# Patient Record
Sex: FEMALE | Race: WHITE | NOT HISPANIC OR LATINO | Employment: OTHER | URBAN - METROPOLITAN AREA
[De-identification: names, ages, dates, MRNs, and addresses within clinical notes are randomized per-mention and may not be internally consistent; named-entity substitution may affect disease eponyms.]

---

## 2017-10-12 ENCOUNTER — CONVERSION ENCOUNTER (OUTPATIENT)
Dept: GENERAL RADIOLOGY | Facility: HOSPITAL | Age: 52
End: 2017-10-12

## 2018-03-23 ENCOUNTER — OFFICE VISIT CONVERTED (OUTPATIENT)
Dept: FAMILY MEDICINE CLINIC | Facility: CLINIC | Age: 53
End: 2018-03-23
Attending: NURSE PRACTITIONER

## 2018-07-19 ENCOUNTER — CONVERSION ENCOUNTER (OUTPATIENT)
Dept: FAMILY MEDICINE CLINIC | Facility: CLINIC | Age: 53
End: 2018-07-19

## 2018-07-19 ENCOUNTER — OFFICE VISIT CONVERTED (OUTPATIENT)
Dept: FAMILY MEDICINE CLINIC | Facility: CLINIC | Age: 53
End: 2018-07-19
Attending: NURSE PRACTITIONER

## 2018-08-09 ENCOUNTER — CONVERSION ENCOUNTER (OUTPATIENT)
Dept: FAMILY MEDICINE CLINIC | Facility: CLINIC | Age: 53
End: 2018-08-09

## 2018-08-09 ENCOUNTER — OFFICE VISIT CONVERTED (OUTPATIENT)
Dept: FAMILY MEDICINE CLINIC | Facility: CLINIC | Age: 53
End: 2018-08-09
Attending: NURSE PRACTITIONER

## 2018-11-29 ENCOUNTER — CONVERSION ENCOUNTER (OUTPATIENT)
Dept: FAMILY MEDICINE CLINIC | Facility: CLINIC | Age: 53
End: 2018-11-29

## 2018-11-29 ENCOUNTER — OFFICE VISIT CONVERTED (OUTPATIENT)
Dept: FAMILY MEDICINE CLINIC | Facility: CLINIC | Age: 53
End: 2018-11-29
Attending: NURSE PRACTITIONER

## 2019-02-07 ENCOUNTER — OFFICE VISIT CONVERTED (OUTPATIENT)
Dept: FAMILY MEDICINE CLINIC | Facility: CLINIC | Age: 54
End: 2019-02-07
Attending: NURSE PRACTITIONER

## 2019-02-26 ENCOUNTER — OFFICE VISIT CONVERTED (OUTPATIENT)
Dept: FAMILY MEDICINE CLINIC | Facility: CLINIC | Age: 54
End: 2019-02-26
Attending: NURSE PRACTITIONER

## 2019-02-26 ENCOUNTER — CONVERSION ENCOUNTER (OUTPATIENT)
Dept: FAMILY MEDICINE CLINIC | Facility: CLINIC | Age: 54
End: 2019-02-26

## 2019-02-28 ENCOUNTER — HOSPITAL ENCOUNTER (OUTPATIENT)
Dept: GENERAL RADIOLOGY | Facility: HOSPITAL | Age: 54
Discharge: HOME | End: 2019-02-28
Attending: NURSE PRACTITIONER

## 2019-03-06 ENCOUNTER — OFFICE VISIT CONVERTED (OUTPATIENT)
Dept: SURGERY | Facility: CLINIC | Age: 54
End: 2019-03-06
Attending: NURSE PRACTITIONER

## 2019-03-15 ENCOUNTER — HOSPITAL ENCOUNTER (OUTPATIENT)
Dept: SURGERY | Facility: HOSPITAL | Age: 54
Setting detail: HOSPITAL OUTPATIENT SURGERY
Discharge: HOME | End: 2019-03-15
Attending: SURGERY

## 2019-07-12 ENCOUNTER — OFFICE VISIT CONVERTED (OUTPATIENT)
Dept: FAMILY MEDICINE CLINIC | Facility: CLINIC | Age: 54
End: 2019-07-12
Attending: NURSE PRACTITIONER

## 2019-07-12 ENCOUNTER — HOSPITAL ENCOUNTER (OUTPATIENT)
Dept: LAB | Facility: HOSPITAL | Age: 54
Discharge: HOME | End: 2019-07-12
Attending: NURSE PRACTITIONER

## 2019-07-12 LAB
25(OH)D3 SERPL-MCNC: 29.4 NG/ML (ref 30–100)
ALBUMIN SERPL-MCNC: 3.8 G/DL (ref 3.5–5)
ALBUMIN/GLOB SERPL: 1 {RATIO} (ref 1.4–2.6)
ALP SERPL-CCNC: 157 U/L (ref 53–141)
ALT SERPL-CCNC: 11 U/L (ref 10–40)
ANION GAP SERPL CALC-SCNC: 18 MMOL/L (ref 8–19)
AST SERPL-CCNC: 15 U/L (ref 15–50)
BASOPHILS # BLD AUTO: 0.03 10*3/UL (ref 0–0.2)
BASOPHILS NFR BLD AUTO: 0.5 % (ref 0–3)
BILIRUB SERPL-MCNC: 0.35 MG/DL (ref 0.2–1.3)
BUN SERPL-MCNC: 11 MG/DL (ref 5–25)
BUN/CREAT SERPL: 10 {RATIO} (ref 6–20)
CALCIUM SERPL-MCNC: 9.5 MG/DL (ref 8.7–10.4)
CHLORIDE SERPL-SCNC: 109 MMOL/L (ref 99–111)
CHOLEST SERPL-MCNC: 129 MG/DL (ref 107–200)
CHOLEST/HDLC SERPL: 3 {RATIO} (ref 3–6)
CONV ABS IMM GRAN: 0.01 10*3/UL (ref 0–0.2)
CONV CO2: 24 MMOL/L (ref 22–32)
CONV IMMATURE GRAN: 0.2 % (ref 0–1.8)
CONV TOTAL PROTEIN: 7.8 G/DL (ref 6.3–8.2)
CREAT UR-MCNC: 1.12 MG/DL (ref 0.5–0.9)
DEPRECATED RDW RBC AUTO: 51.2 FL (ref 36.4–46.3)
EOSINOPHIL # BLD AUTO: 0.09 10*3/UL (ref 0–0.7)
EOSINOPHIL # BLD AUTO: 1.4 % (ref 0–7)
ERYTHROCYTE [DISTWIDTH] IN BLOOD BY AUTOMATED COUNT: 15 % (ref 11.7–14.4)
GFR SERPLBLD BASED ON 1.73 SQ M-ARVRAT: 56 ML/MIN/{1.73_M2}
GLOBULIN UR ELPH-MCNC: 4 G/DL (ref 2–3.5)
GLUCOSE SERPL-MCNC: 95 MG/DL (ref 65–99)
HBA1C MFR BLD: 12.3 G/DL (ref 12–16)
HCT VFR BLD AUTO: 39.1 % (ref 37–47)
HDLC SERPL-MCNC: 43 MG/DL (ref 40–60)
LDLC SERPL CALC-MCNC: 59 MG/DL (ref 70–100)
LYMPHOCYTES # BLD AUTO: 2.96 10*3/UL (ref 1–5)
MCH RBC QN AUTO: 29.1 PG (ref 27–31)
MCHC RBC AUTO-ENTMCNC: 31.5 G/DL (ref 33–37)
MCV RBC AUTO: 92.4 FL (ref 81–99)
MONOCYTES # BLD AUTO: 0.5 10*3/UL (ref 0.2–1.2)
MONOCYTES NFR BLD AUTO: 8 % (ref 3–10)
NEUTROPHILS # BLD AUTO: 2.63 10*3/UL (ref 2–8)
NEUTROPHILS NFR BLD AUTO: 42.3 % (ref 30–85)
NRBC CBCN: 0 % (ref 0–0.7)
OSMOLALITY SERPL CALC.SUM OF ELEC: 303 MOSM/KG (ref 273–304)
PLATELET # BLD AUTO: 173 10*3/UL (ref 130–400)
PMV BLD AUTO: 11.7 FL (ref 9.4–12.3)
POTASSIUM SERPL-SCNC: 3.5 MMOL/L (ref 3.5–5.3)
RBC # BLD AUTO: 4.23 10*6/UL (ref 4.2–5.4)
SODIUM SERPL-SCNC: 147 MMOL/L (ref 135–147)
T4 FREE SERPL-MCNC: 1.1 NG/DL (ref 0.9–1.8)
TRIGL SERPL-MCNC: 133 MG/DL (ref 40–150)
TSH SERPL-ACNC: 1.36 M[IU]/L (ref 0.27–4.2)
VARIANT LYMPHS NFR BLD MANUAL: 47.6 % (ref 20–45)
VLDLC SERPL-MCNC: 27 MG/DL (ref 5–37)
WBC # BLD AUTO: 6.22 10*3/UL (ref 4.8–10.8)

## 2019-07-18 ENCOUNTER — HOSPITAL ENCOUNTER (OUTPATIENT)
Dept: GENERAL RADIOLOGY | Facility: HOSPITAL | Age: 54
Discharge: HOME | End: 2019-07-18
Attending: NURSE PRACTITIONER

## 2019-07-23 ENCOUNTER — HOSPITAL ENCOUNTER (OUTPATIENT)
Dept: GENERAL RADIOLOGY | Facility: HOSPITAL | Age: 54
Discharge: HOME | End: 2019-07-23
Attending: NURSE PRACTITIONER

## 2019-11-13 ENCOUNTER — OFFICE VISIT CONVERTED (OUTPATIENT)
Dept: FAMILY MEDICINE CLINIC | Facility: CLINIC | Age: 54
End: 2019-11-13
Attending: NURSE PRACTITIONER

## 2019-11-18 ENCOUNTER — HOSPITAL ENCOUNTER (OUTPATIENT)
Dept: GENERAL RADIOLOGY | Facility: HOSPITAL | Age: 54
Discharge: HOME | End: 2019-11-18
Attending: NURSE PRACTITIONER

## 2020-08-26 ENCOUNTER — OFFICE VISIT CONVERTED (OUTPATIENT)
Dept: FAMILY MEDICINE CLINIC | Facility: CLINIC | Age: 55
End: 2020-08-26
Attending: PHYSICIAN ASSISTANT

## 2020-08-28 ENCOUNTER — HOSPITAL ENCOUNTER (OUTPATIENT)
Dept: LAB | Facility: HOSPITAL | Age: 55
Discharge: HOME | End: 2020-08-28
Attending: PHYSICIAN ASSISTANT

## 2020-08-28 LAB
25(OH)D3 SERPL-MCNC: 25 NG/ML (ref 30–100)
ALBUMIN SERPL-MCNC: 3.6 G/DL (ref 3.5–5)
ALBUMIN/GLOB SERPL: 0.9 {RATIO} (ref 1.4–2.6)
ALP SERPL-CCNC: 141 U/L (ref 53–141)
ALT SERPL-CCNC: 11 U/L (ref 10–40)
ANION GAP SERPL CALC-SCNC: 14 MMOL/L (ref 8–19)
AST SERPL-CCNC: 19 U/L (ref 15–50)
BASOPHILS # BLD AUTO: 0.03 10*3/UL (ref 0–0.2)
BASOPHILS NFR BLD AUTO: 0.5 % (ref 0–3)
BILIRUB SERPL-MCNC: 0.34 MG/DL (ref 0.2–1.3)
BUN SERPL-MCNC: 14 MG/DL (ref 5–25)
BUN/CREAT SERPL: 12 {RATIO} (ref 6–20)
CALCIUM SERPL-MCNC: 10.4 MG/DL (ref 8.7–10.4)
CHLORIDE SERPL-SCNC: 110 MMOL/L (ref 99–111)
CHOLEST SERPL-MCNC: 155 MG/DL (ref 107–200)
CHOLEST/HDLC SERPL: 3.4 {RATIO} (ref 3–6)
CONV ABS IMM GRAN: 0.01 10*3/UL (ref 0–0.2)
CONV CO2: 26 MMOL/L (ref 22–32)
CONV IMMATURE GRAN: 0.2 % (ref 0–1.8)
CONV TOTAL PROTEIN: 7.4 G/DL (ref 6.3–8.2)
CREAT UR-MCNC: 1.16 MG/DL (ref 0.5–0.9)
DEPRECATED RDW RBC AUTO: 51.5 FL (ref 36.4–46.3)
EOSINOPHIL # BLD AUTO: 0.14 10*3/UL (ref 0–0.7)
EOSINOPHIL # BLD AUTO: 2.2 % (ref 0–7)
ERYTHROCYTE [DISTWIDTH] IN BLOOD BY AUTOMATED COUNT: 14.8 % (ref 11.7–14.4)
GFR SERPLBLD BASED ON 1.73 SQ M-ARVRAT: 53 ML/MIN/{1.73_M2}
GLOBULIN UR ELPH-MCNC: 3.8 G/DL (ref 2–3.5)
GLUCOSE SERPL-MCNC: 91 MG/DL (ref 65–99)
HCT VFR BLD AUTO: 40.3 % (ref 37–47)
HDLC SERPL-MCNC: 46 MG/DL (ref 40–60)
HGB BLD-MCNC: 12.4 G/DL (ref 12–16)
LDLC SERPL CALC-MCNC: 82 MG/DL (ref 70–100)
LYMPHOCYTES # BLD AUTO: 3.03 10*3/UL (ref 1–5)
LYMPHOCYTES NFR BLD AUTO: 48.2 % (ref 20–45)
MCH RBC QN AUTO: 29.1 PG (ref 27–31)
MCHC RBC AUTO-ENTMCNC: 30.8 G/DL (ref 33–37)
MCV RBC AUTO: 94.6 FL (ref 81–99)
MONOCYTES # BLD AUTO: 0.45 10*3/UL (ref 0.2–1.2)
MONOCYTES NFR BLD AUTO: 7.2 % (ref 3–10)
NEUTROPHILS # BLD AUTO: 2.62 10*3/UL (ref 2–8)
NEUTROPHILS NFR BLD AUTO: 41.7 % (ref 30–85)
NRBC CBCN: 0 % (ref 0–0.7)
OSMOLALITY SERPL CALC.SUM OF ELEC: 300 MOSM/KG (ref 273–304)
PLATELET # BLD AUTO: 184 10*3/UL (ref 130–400)
PMV BLD AUTO: 10.9 FL (ref 9.4–12.3)
POTASSIUM SERPL-SCNC: 4.6 MMOL/L (ref 3.5–5.3)
RBC # BLD AUTO: 4.26 10*6/UL (ref 4.2–5.4)
SODIUM SERPL-SCNC: 145 MMOL/L (ref 135–147)
T4 FREE SERPL-MCNC: 0.9 NG/DL (ref 0.9–1.8)
TRIGL SERPL-MCNC: 135 MG/DL (ref 40–150)
TSH SERPL-ACNC: 2.19 M[IU]/L (ref 0.27–4.2)
VLDLC SERPL-MCNC: 27 MG/DL (ref 5–37)
WBC # BLD AUTO: 6.28 10*3/UL (ref 4.8–10.8)

## 2021-02-22 ENCOUNTER — CONVERSION ENCOUNTER (OUTPATIENT)
Dept: FAMILY MEDICINE CLINIC | Facility: CLINIC | Age: 56
End: 2021-02-22

## 2021-02-22 ENCOUNTER — OFFICE VISIT CONVERTED (OUTPATIENT)
Dept: FAMILY MEDICINE CLINIC | Facility: CLINIC | Age: 56
End: 2021-02-22
Attending: PHYSICIAN ASSISTANT

## 2021-02-26 ENCOUNTER — HOSPITAL ENCOUNTER (OUTPATIENT)
Dept: LAB | Facility: HOSPITAL | Age: 56
Discharge: HOME | End: 2021-02-26
Attending: PHYSICIAN ASSISTANT

## 2021-02-26 LAB
25(OH)D3 SERPL-MCNC: 26 NG/ML (ref 30–100)
ALBUMIN SERPL-MCNC: 3.6 G/DL (ref 3.5–5)
ALBUMIN/GLOB SERPL: 1 {RATIO} (ref 1.4–2.6)
ALP SERPL-CCNC: 162 U/L (ref 53–141)
ALT SERPL-CCNC: 8 U/L (ref 10–40)
ANION GAP SERPL CALC-SCNC: 15 MMOL/L (ref 8–19)
APPEARANCE UR: ABNORMAL
AST SERPL-CCNC: 15 U/L (ref 15–50)
BASOPHILS # BLD AUTO: 0.03 10*3/UL (ref 0–0.2)
BASOPHILS NFR BLD AUTO: 0.5 % (ref 0–3)
BILIRUB SERPL-MCNC: 0.24 MG/DL (ref 0.2–1.3)
BILIRUB UR QL: NEGATIVE
BUN SERPL-MCNC: 15 MG/DL (ref 5–25)
BUN/CREAT SERPL: 15 {RATIO} (ref 6–20)
CALCIUM SERPL-MCNC: 9.4 MG/DL (ref 8.7–10.4)
CHLORIDE SERPL-SCNC: 112 MMOL/L (ref 99–111)
CHOLEST SERPL-MCNC: 128 MG/DL (ref 107–200)
CHOLEST/HDLC SERPL: 2.8 {RATIO} (ref 3–6)
COLOR UR: YELLOW
CONV ABS IMM GRAN: 0.02 10*3/UL (ref 0–0.2)
CONV BACTERIA: ABNORMAL
CONV CO2: 23 MMOL/L (ref 22–32)
CONV COLLECTION SOURCE (UA): ABNORMAL
CONV HYALINE CASTS IN URINE MICRO: ABNORMAL /[LPF]
CONV IMMATURE GRAN: 0.3 % (ref 0–1.8)
CONV TOTAL PROTEIN: 7.3 G/DL (ref 6.3–8.2)
CONV UROBILINOGEN IN URINE BY AUTOMATED TEST STRIP: 0.2 {EHRLICHU}/DL (ref 0.1–1)
CREAT UR-MCNC: 1.02 MG/DL (ref 0.5–0.9)
DEPRECATED RDW RBC AUTO: 52.2 FL (ref 36.4–46.3)
EOSINOPHIL # BLD AUTO: 0.15 10*3/UL (ref 0–0.7)
EOSINOPHIL # BLD AUTO: 2.4 % (ref 0–7)
ERYTHROCYTE [DISTWIDTH] IN BLOOD BY AUTOMATED COUNT: 14.9 % (ref 11.7–14.4)
GFR SERPLBLD BASED ON 1.73 SQ M-ARVRAT: >60 ML/MIN/{1.73_M2}
GLOBULIN UR ELPH-MCNC: 3.7 G/DL (ref 2–3.5)
GLUCOSE SERPL-MCNC: 89 MG/DL (ref 65–99)
GLUCOSE UR QL: NEGATIVE MG/DL
HCT VFR BLD AUTO: 38 % (ref 37–47)
HDLC SERPL-MCNC: 45 MG/DL (ref 40–60)
HGB BLD-MCNC: 11.7 G/DL (ref 12–16)
HGB UR QL STRIP: NEGATIVE
KETONES UR QL STRIP: NEGATIVE MG/DL
LDLC SERPL CALC-MCNC: 65 MG/DL (ref 70–100)
LEUKOCYTE ESTERASE UR QL STRIP: ABNORMAL
LYMPHOCYTES # BLD AUTO: 3.27 10*3/UL (ref 1–5)
LYMPHOCYTES NFR BLD AUTO: 53.3 % (ref 20–45)
MCH RBC QN AUTO: 29 PG (ref 27–31)
MCHC RBC AUTO-ENTMCNC: 30.8 G/DL (ref 33–37)
MCV RBC AUTO: 94.1 FL (ref 81–99)
MONOCYTES # BLD AUTO: 0.51 10*3/UL (ref 0.2–1.2)
MONOCYTES NFR BLD AUTO: 8.3 % (ref 3–10)
NEUTROPHILS # BLD AUTO: 2.15 10*3/UL (ref 2–8)
NEUTROPHILS NFR BLD AUTO: 35.2 % (ref 30–85)
NITRITE UR QL STRIP: NEGATIVE
NRBC CBCN: 0 % (ref 0–0.7)
OSMOLALITY SERPL CALC.SUM OF ELEC: 302 MOSM/KG (ref 273–304)
PH UR STRIP.AUTO: 7 [PH] (ref 5–8)
PLATELET # BLD AUTO: 179 10*3/UL (ref 130–400)
PMV BLD AUTO: 11.5 FL (ref 9.4–12.3)
POTASSIUM SERPL-SCNC: 3.6 MMOL/L (ref 3.5–5.3)
PROT UR QL: NEGATIVE MG/DL
RBC # BLD AUTO: 4.04 10*6/UL (ref 4.2–5.4)
RBC #/AREA URNS HPF: ABNORMAL /[HPF]
SODIUM SERPL-SCNC: 146 MMOL/L (ref 135–147)
SP GR UR: 1.01 (ref 1–1.03)
SQUAMOUS SPT QL MICRO: ABNORMAL /[HPF]
T4 FREE SERPL-MCNC: 1.4 NG/DL (ref 0.9–1.8)
TRIGL SERPL-MCNC: 88 MG/DL (ref 40–150)
TSH SERPL-ACNC: 0.57 M[IU]/L (ref 0.27–4.2)
VLDLC SERPL-MCNC: 18 MG/DL (ref 5–37)
WBC # BLD AUTO: 6.13 10*3/UL (ref 4.8–10.8)
WBC #/AREA URNS HPF: ABNORMAL /[HPF]

## 2021-02-28 LAB — BACTERIA UR CULT: NORMAL

## 2021-03-03 ENCOUNTER — HOSPITAL ENCOUNTER (OUTPATIENT)
Dept: LAB | Facility: HOSPITAL | Age: 56
Discharge: HOME | End: 2021-03-03
Attending: PHYSICIAN ASSISTANT

## 2021-03-03 ENCOUNTER — HOSPITAL ENCOUNTER (OUTPATIENT)
Dept: GENERAL RADIOLOGY | Facility: HOSPITAL | Age: 56
Discharge: HOME | End: 2021-03-03
Attending: PHYSICIAN ASSISTANT

## 2021-03-05 LAB
ALP BONE CFR SERPL: 29 % (ref 14–68)
ALP INTEST CFR SERPL: 2 % (ref 0–18)
ALP LIVER CFR SERPL: 69 % (ref 18–85)
ALP SERPL-CCNC: 156 IU/L (ref 39–117)

## 2021-05-13 NOTE — PROGRESS NOTES
Progress Note      Patient Name: Carlee Shipley   Patient ID: [id number]   Sex: Female   YOB: 1965    Primary Care Provider: Jeannie SOLER   Referring Provider: Jeannie SOLER    Visit Date: August 26, 2020    Provider: KARLENE Frey   Location: [facility]   Location Address: [address] [place] [address], [address]  [place], [place]  [id number]   Location Phone: [phone]          Chief Complaint  · follow up/medication refills       History Of Present Illness  Carlee Shipley is a 54 year old /White female who presents for evaluation and treatment of:      Patient is here for follow up/medication refills     Anxiety: patient is on Zoloft & doing well. She states she is sleeping well & her energy is good for the most part. She denies SI/HI. She also has hydroxyzine to take prn & she sees physchologist every month; Charly Paredes    Migraines: She is currently on Gabapentin, Propanolol , Topamax & Alsuma & states she is doing well. She is followed by [facility] [place] every 3m. She is also taking Promethazine prn.    Back Pain: She is currently on Norco & doing well. She is followed by Pain Management every month    Hypothyroidism: Patient is currently on Levothyroxine 100 & states she is doing well. She is sleeping well & her energy level is good for the most part. It has been over a year since labs have been done    HL: Patient is currently on Atorvastatin & doing well. She denies leg cramps/pain or muscle weakness. It has been over a year since labs have been done.                      Past Medical History  Disease Name Date Onset Notes   Arthritis --  --    Depression --  --    Hyperlipidemia --  --    Hypothyroidism --  --    Low vitamin D --  --    Lumbago/low back pain --  --    Major depressive disorder 07/11/2017 --    Migraine Headaches --  --    Muscle weakness of lower extremity 05/12/2015 right foot   Radiculopathy, lumbosacral 05/12/2015  right   Spleen injury --  --          Past Surgical History  Procedure Name Date Notes   Adenoidectomy 1972 --    Cholecystectomy --  --    Colonoscopy 5/2015 --    endoscopy 5/2015 --    Hysterectomy --  --    Spinal Cord Stimulator --  --    Splenectomy --  --    Tonsillectomy --  --          Medication List  Name Date Started Instructions   Alsuma 6 mg/0.5 mL Subcutaneous Pen Injector  inject 0.5 milliliter (6 mg) by subcutaneous route once; may be repeated 1 hour after the first dose if headache pain returns or increases i   atorvastatin 40 mg oral tablet 06/19/2020 TAKE ONE TABLET AT BEDTIME   EpiPen 0.3 mg/0.3 mL (1:1,000) injection auto-injector 04/28/2015 inject 0.3 mg by intramuscular route once as needed for anaphylaxis   ergocalciferol (vitamin D2) 1,250 mcg (50,000 unit) oral capsule 08/26/2020 TAKE ONE CAPSULE ONE TIME PER WEEK. for 90 days   gabapentin 300 mg oral capsule 09/20/2017 TAKE 2 CAPSULES BY MOUTH THREE TIMES A DAY   hydroxyzine HCl 10 mg oral tablet  take 1 tablet by oral route As needed   levothyroxine 100 mcg oral tablet 08/26/2020 take 1 tablet (100 mcg) by oral route once daily for 90 days   Norco  mg oral tablet 11/13/2019 1-2 po q 4-6hrs prn   promethazine 25 mg oral tablet  take 1 tablet by oral route q6hrs prn   propranolol 160 mg oral capsule,extended release 24 hr  take 1 capsule (160 mg) by oral route once daily   Topamax 100 mg Oral tablet  take 2 tablets by oral route   Vraylar 3 mg oral capsule  take 1 capsule (3 mg) by oral route once daily   Zoloft 50 mg oral tablet  take 1 tablet (50 mg) by oral route once daily         Allergy List  Allergen Name Date Reaction Notes   azithromycin --  nausea, dehydration --    Fish --  --  --    INFLUENZA VIRUS VACCINES --  --  --    pork --  --  --    Seasonal --  --  --        Allergies Reconciled  Family Medical History  Disease Name Relative/Age Notes   Stroke Sister/   --    Cancer, Unspecified  --    Diabetes, unspecified  "Mother/  Sister/   --          Reproductive History  Menstrual   Age Menarche: 11   Pregnancy Summary   Total Pregnancies: 1 Full Term: 1 Premature: 0   Ab Induced: 0 Ab Spontaneous: 0 Ectopics: 0   Multiples: 0 Livin         Social History  Finding Status Start/Stop Quantity Notes   Alcohol Never --/-- --  --     --  --/-- --  --    No known infection risk --  --/-- --  --    Sedentary --  --/-- --  --    Tobacco Never --/-- --  2017 - never 2017 - never 2017 - never          Review of Systems  · Constitutional  o Denies  o : fever, fatigue, weight loss, weight gain  · Cardiovascular  o Denies  o : lower extremity edema, claudication, chest pressure, palpitations  · Respiratory  o Denies  o : shortness of breath, wheezing, cough, hemoptysis, dyspnea on exertion  · Gastrointestinal  o Denies  o : nausea, vomiting, diarrhea, constipation, abdominal pain      Vitals  Date Time BP Position Site L\R Cuff Size HR RR TEMP (F) WT  HT  BMI kg/m2 BSA m2 O2 Sat        2020 11:42 /63 Sitting    70 - R  98.2 262lbs 4oz 5'  3\" 46.46 2.3 97 %          Physical Examination  · Constitutional  o Appearance  o : well developed, well-nourished obese, no acute distress  · Head and Face  o Head  o : normocephalic, atraumatic  · Neck  o Inspection/Palpation  o : normal appearance, no masses or tenderness, trachea midline  o Thyroid  o : gland size normal, nontender, no nodules or masses present on palpation  · Respiratory  o Respiratory Effort  o : breathing unlabored  o Inspection of Chest  o : chest rise symmetric bilaterally  o Auscultation of Lungs  o : clear to auscultation bilaterally throughout inspiration and expiration  · Cardiovascular  o Heart  o :   § Auscultation of Heart  § : regular rate and rhythm, no murmurs, gallops or rubs  o Peripheral Vascular System  o :   § Extremities  § : no edema  · Lymphatic  o Neck  o : no cervical lymphadenopathy, no supraclavicular " lymphadenopathy  · Psychiatric  o Mood and Affect  o : mood normal, affect appropriate          Assessment  · Anxiety disorder     300.00/F41.9  · Hyperlipidemia     272.4/E78.5  · Hypothyroidism     244.9/E03.9  · Vitamin D deficiency     268.9/E55.9  · Lumbago/low back pain     724.2/M54.5  · Migraine     346.90/G43.909    Problems Reconciled  Plan  · Orders  o HTN/Lipid Panel (CMP, Lipid) [facility] (90702, 34242) - 272.4/E78.5 - 08/26/2020  o Thyroid Profile (THYII, 66024, 22548) - 244.9/E03.9 - 08/26/2020  o Vitamin D Level (24384) - 268.9/E55.9 - 08/26/2020  o CBC with Auto Diff [facility] (47828) - 346.90/G43.909, 300.00/F41.9, 244.9/E03.9 - 08/26/2020  o ACO-39: Current medications updated and reviewed () - - 08/26/2020  · Medications  o levothyroxine 100 mcg oral tablet   SIG: take 1 tablet (100 mcg) by oral route once daily for 90 days   DISP: (90) Tablet with 1 refills  Adjusted on 08/26/2020     o ergocalciferol (vitamin D2) 50,000 unit oral capsule   SIG: TAKE ONE CAPSULE ONE TIME PER WEEK. for 90 days   DISP: (13) Capsule with 3 refills  Refilled on 08/26/2020     o Medications have been Reconciled  o Transition of Care or Provider Policy  · Instructions  o Recommended exercise program to assist with cholesterol, weight loss and overall health improvement.  o Patient was educated/instructed on their diagnosis, treatment and medications prior to discharge from the clinic today.  o Call the office with any concerns or questions.  o Electronically Identified Patient Education Materials Provided Electronically  · Disposition  o Follow Up in 6 months.            Electronically Signed by: KARLENE Frey -Author on August 26, 2020 12:20:28 PM

## 2021-05-14 VITALS
HEIGHT: 63 IN | HEART RATE: 69 BPM | TEMPERATURE: 98.3 F | SYSTOLIC BLOOD PRESSURE: 109 MMHG | OXYGEN SATURATION: 99 % | WEIGHT: 275.12 LBS | DIASTOLIC BLOOD PRESSURE: 68 MMHG | BODY MASS INDEX: 48.75 KG/M2

## 2021-05-14 VITALS
TEMPERATURE: 98.2 F | OXYGEN SATURATION: 97 % | WEIGHT: 262.25 LBS | DIASTOLIC BLOOD PRESSURE: 63 MMHG | HEIGHT: 63 IN | HEART RATE: 70 BPM | SYSTOLIC BLOOD PRESSURE: 119 MMHG | BODY MASS INDEX: 46.46 KG/M2

## 2021-05-14 NOTE — PROGRESS NOTES
Progress Note      Patient Name: Carlee hSipley   Patient ID: [id number]   Sex: Female   YOB: 1965    Primary Care Provider: Jeannie SOLER   Referring Provider: Jeannie SOLER    Visit Date: February 22, 2021    Provider: KARLENE Frey   Location: [facility]   Location Address: [address] [place] [address], [address]  [place], [place]  [id number]   Location Phone: [phone]          Chief Complaint  · follow up   · medication refill       History Of Present Illness  Carlee Shipley is a 55 year old /White female who presents for evaluation and treatment of:      Patient is here for follow up and medication refills; last labs showed some renal insuffiency.    Anxiety: She is taking Zoloft and doing well. She is followed by Charly Paredes every 3m. She states she can fall asleep but states she has trouble staying asleep     Migraines: She is taking Gabapentin, Propanolol, Topamax and Alsuma and doing well. She is followed by Neurology Dr Humphreys every 3m. She also has Promethazine to take prn    Hypothyroidism: She is taking Levothyroixine with good results, she states her energy level is ok. She has issues staying asleep     RLS: She just started Benztropine 5d ago and is followed by Charly Paredes    Vitamin D def: patient is taking otc vitamin D 2000 iu qd; due for recheck    HL: She is taking Atorvastatin with good results and denies leg pain/cramps and muscle weakness     MMG ordered 3/3/21  CLN 5/2019           Past Medical History  Disease Name Date Onset Notes   Arthritis --  --    Depression --  --    Hyperlipidemia --  --    Hypothyroidism --  --    Low vitamin D --  --    Lumbago/low back pain --  --    Major depressive disorder 07/11/2017 --    Migraine Headaches --  --    Muscle weakness of lower extremity 05/12/2015 right foot   Radiculopathy, lumbosacral 05/12/2015 right   Spleen injury --  --          Past Surgical History  Procedure Name  Date Notes   Adenoidectomy 1972 --    Cholecystectomy --  --    Colonoscopy 5/2015 --    endoscopy 5/2015 --    Hysterectomy --  --    Spinal Cord Stimulator --  --    Splenectomy --  --    Tonsillectomy --  --          Medication List  Name Date Started Instructions   Alsuma 6 mg/0.5 mL Subcutaneous Pen Injector  inject 0.5 milliliter (6 mg) by subcutaneous route once; may be repeated 1 hour after the first dose if headache pain returns or increases i   atorvastatin 40 mg oral tablet 09/25/2020 TAKE ONE TABLET AT BEDTIME for 90 days   benztropine 1 mg oral tablet  take 1 tablet (1 mg) by oral route once daily   EpiPen 0.3 mg/0.3 mL (1:1,000) injection auto-injector 04/28/2015 inject 0.3 mg by intramuscular route once as needed for anaphylaxis   ergocalciferol (vitamin D2) 1,250 mcg (50,000 unit) oral capsule 08/26/2020 TAKE ONE CAPSULE ONE TIME PER WEEK. for 90 days   gabapentin 300 mg oral capsule 09/20/2017 TAKE 2 CAPSULES BY MOUTH THREE TIMES A DAY   hydroxyzine HCl 10 mg oral tablet  take 1 tablet by oral route As needed   levothyroxine 100 mcg oral tablet 08/26/2020 take 1 tablet (100 mcg) by oral route once daily for 90 days   Norco  mg oral tablet 11/13/2019 1-2 po q 4-6hrs prn   promethazine 25 mg oral tablet  take 1 tablet by oral route q6hrs prn   propranolol 160 mg oral capsule,extended release 24 hr  take 1 capsule (160 mg) by oral route once daily   Topamax 100 mg Oral tablet  take 2 tablets by oral route   Vraylar 3 mg oral capsule  take 1 capsule (3 mg) by oral route once daily   Zoloft 50 mg oral tablet  take 1 tablet (50 mg) by oral route once daily         Allergy List  Allergen Name Date Reaction Notes   azithromycin --  nausea, dehydration --    Fish --  --  --    INFLUENZA VIRUS VACCINES --  --  --    pork --  --  --    Seasonal --  --  --        Allergies Reconciled  Family Medical History  Disease Name Relative/Age Notes   Stroke Sister/   --    Cancer, Unspecified  --    Diabetes,  "unspecified Mother/  Sister/   --          Reproductive History  Menstrual   Age Menarche: 11   Pregnancy Summary   Total Pregnancies: 1 Full Term: 1 Premature: 0   Ab Induced: 0 Ab Spontaneous: 0 Ectopics: 0   Multiples: 0 Livin         Social History  Finding Status Start/Stop Quantity Notes   Alcohol Never --/-- --  --     --  --/-- --  --    No known infection risk --  --/-- --  --    Sedentary --  --/-- --  --    Tobacco Never --/-- --  2017 - never 2017 - never 2017 - never          Review of Systems  · Constitutional  o Denies  o : fatigue, night sweats  · Eyes  o Denies  o : double vision, blurred vision  · HENT  o Denies  o : vertigo, recent head injury  · Breasts  o Denies  o : abnormal changes in breast size, additional breast symptoms except as noted in the HPI  · Cardiovascular  o Denies  o : chest pain, irregular heart beats  · Respiratory  o Denies  o : shortness of breath, productive cough  · Gastrointestinal  o Denies  o : nausea, vomiting  · Genitourinary  o Denies  o : dysuria, urinary retention  · Integument  o Denies  o : hair growth change, new skin lesions  · Neurologic  o Denies  o : altered mental status, seizures  · Musculoskeletal  o Denies  o : joint swelling, limitation of motion  · Endocrine  o Denies  o : cold intolerance, heat intolerance  · Psychiatric  o Admits  o : difficulty sleeping  · Heme-Lymph  o Denies  o : petechiae, lymph node enlargement or tenderness  · Allergic-Immunologic  o Denies  o : frequent illnesses      Vitals  Date Time BP Position Site L\R Cuff Size HR RR TEMP (F) WT  HT  BMI kg/m2 BSA m2 O2 Sat FR L/min FiO2        2021 12:12 /68 Sitting    69 - R  98.3 275lbs 2oz 5'  3\" 48.74 2.36 99 %  21%          Physical Examination  · Constitutional  o Appearance  o : well developed, well-nourished, obese in no acute distress  · Head and Face  o Head  o : normocephalic, atraumatic  · Neck  o Inspection/Palpation  o : normal " appearance, no masses or tenderness, trachea midline  o Thyroid  o : gland size normal, nontender, no nodules or masses present on palpation  · Respiratory  o Respiratory Effort  o : breathing unlabored  o Inspection of Chest  o : chest rise symmetric bilaterally  o Auscultation of Lungs  o : clear to auscultation bilaterally throughout inspiration and expiration  · Cardiovascular  o Heart  o :   § Auscultation of Heart  § : regular rate and rhythm, no murmurs, gallops or rubs  o Peripheral Vascular System  o :   § Extremities  § : no edema  · Lymphatic  o Neck  o : no cervical lymphadenopathy, no supraclavicular lymphadenopathy  · Psychiatric  o Mood and Affect  o : mood normal, affect appropriate          Assessment  · Screening for depression     V79.0/Z13.89  · Hyperlipidemia     272.4/E78.5  · Hypothyroidism     244.9/E03.9  · Obesity     278.00/E66.9  · Vitamin D deficiency     268.9/E55.9  · Renal insufficiency     593.9/N28.9  · Medication management     V58.69/Z79.899  · Migraine     346.90/G43.909  · RLS (restless legs syndrome)     333.94/G25.81       Patient appears to be doing well on current medications; continue with specialists. Labs ordered today and levothyroxine refilled. Will f/u in 6mths or prn.     Problems Reconciled  Plan  · Orders  o ACO-18: Negative screen for clinical depression using a standardized tool () - V79.0/Z13.89 - 02/22/2021  o HTN/Lipid Panel (CMP, Lipid) [facility] (91013, 38597) - 272.4/E78.5 - 02/22/2021  o Thyroid Profile (12735, 27345, THYII) - 244.9/E03.9 - 02/22/2021  o Vitamin D Level (24093) - 268.9/E55.9 - 02/22/2021  o CBC with Auto Diff [facility] (72630) - 593.9/N28.9, V58.69/Z79.899 - 02/22/2021  o Urinalysis with Reflex Microscopy ([facility]) (39573) - 593.9/N28.9 - 02/22/2021  o ACO-14: Influenza immunization was not administered for reasons documented [facility] () - - 02/22/2021  o ACO-39: Current medications updated and reviewed (, 1987X) - -  02/22/2021  · Medications  o levothyroxine 100 mcg oral tablet   SIG: take 1 tablet (100 mcg) by oral route once daily for 90 days   DISP: (90) Tablet with 1 refills  Refilled on 02/22/2021     o Medications have been Reconciled  o Transition of Care or Provider Policy  · Instructions  o Depression Screen completed and scanned into the EMR under the designated folder within the patient's documents.  o Today's PHQ-9 result is 3  o Recommended exercise program to assist with cholesterol, weight loss and overall health improvement.  o Advised that cheeses and other sources of dairy fats, animal fats, fast food, and the extras (candy, pastries, pies, doughnuts and cookies) all contain LDL raising nutrients. Advised to increase fruits, vegetables, whole grains, and to monitor portion sizes.   o Patient is taking medications as prescribed and doing well.   o Patient was educated/instructed on their diagnosis, treatment and medications prior to discharge from the clinic today.  o Patient instructed to seek medical attention urgently for new or worsening symptoms.  o Call the office with any concerns or questions.  o Chronic conditions reviewed and taken into consideration for today's treatment plan.  o Discussed Covid-19 precautions including, but not limited to, social distancing, avoid touching your face, and hand washing.   o Electronically Identified Patient Education Materials Provided Electronically  · Disposition  o Follow Up in 6 months.            Electronically Signed by: KARLENE Frey -Author on February 22, 2021 12:49:22 PM

## 2021-05-15 VITALS
SYSTOLIC BLOOD PRESSURE: 105 MMHG | HEART RATE: 53 BPM | HEIGHT: 62 IN | WEIGHT: 231.12 LBS | DIASTOLIC BLOOD PRESSURE: 65 MMHG | BODY MASS INDEX: 42.53 KG/M2

## 2021-05-15 VITALS
HEIGHT: 62 IN | HEART RATE: 59 BPM | BODY MASS INDEX: 43.43 KG/M2 | SYSTOLIC BLOOD PRESSURE: 120 MMHG | OXYGEN SATURATION: 99 % | DIASTOLIC BLOOD PRESSURE: 82 MMHG | WEIGHT: 236 LBS

## 2021-05-16 VITALS
WEIGHT: 227 LBS | DIASTOLIC BLOOD PRESSURE: 82 MMHG | HEIGHT: 62 IN | BODY MASS INDEX: 41.77 KG/M2 | SYSTOLIC BLOOD PRESSURE: 124 MMHG | HEART RATE: 69 BPM | OXYGEN SATURATION: 99 %

## 2021-05-16 VITALS
WEIGHT: 259 LBS | SYSTOLIC BLOOD PRESSURE: 143 MMHG | HEART RATE: 61 BPM | DIASTOLIC BLOOD PRESSURE: 68 MMHG | HEIGHT: 62 IN | BODY MASS INDEX: 47.66 KG/M2

## 2021-05-16 VITALS
WEIGHT: 253.12 LBS | SYSTOLIC BLOOD PRESSURE: 166 MMHG | OXYGEN SATURATION: 96 % | DIASTOLIC BLOOD PRESSURE: 83 MMHG | HEART RATE: 68 BPM | TEMPERATURE: 98.3 F | BODY MASS INDEX: 46.58 KG/M2 | HEIGHT: 62 IN

## 2021-05-16 VITALS
HEIGHT: 62 IN | SYSTOLIC BLOOD PRESSURE: 121 MMHG | TEMPERATURE: 98.2 F | HEART RATE: 57 BPM | BODY MASS INDEX: 47.39 KG/M2 | OXYGEN SATURATION: 98 % | DIASTOLIC BLOOD PRESSURE: 70 MMHG | WEIGHT: 257.5 LBS

## 2021-05-16 VITALS
DIASTOLIC BLOOD PRESSURE: 88 MMHG | SYSTOLIC BLOOD PRESSURE: 138 MMHG | WEIGHT: 236.25 LBS | OXYGEN SATURATION: 99 % | BODY MASS INDEX: 43.47 KG/M2 | HEART RATE: 64 BPM | HEIGHT: 62 IN

## 2021-05-16 VITALS
WEIGHT: 265.25 LBS | SYSTOLIC BLOOD PRESSURE: 110 MMHG | HEART RATE: 70 BPM | HEIGHT: 62 IN | BODY MASS INDEX: 48.81 KG/M2 | DIASTOLIC BLOOD PRESSURE: 82 MMHG

## 2021-05-16 VITALS — HEART RATE: 60 BPM | HEIGHT: 62 IN | WEIGHT: 227.5 LBS | BODY MASS INDEX: 41.86 KG/M2

## 2021-05-22 ENCOUNTER — TRANSCRIBE ORDERS (OUTPATIENT)
Dept: ADMINISTRATIVE | Facility: HOSPITAL | Age: 56
End: 2021-05-22

## 2021-05-22 DIAGNOSIS — Z78.0 POST-MENOPAUSAL: Primary | ICD-10-CM

## 2021-07-26 ENCOUNTER — HOSPITAL ENCOUNTER (OUTPATIENT)
Dept: BONE DENSITY | Facility: HOSPITAL | Age: 56
Discharge: HOME | End: 2021-07-26
Admitting: NURSE PRACTITIONER

## 2021-07-26 DIAGNOSIS — Z78.0 POST-MENOPAUSAL: ICD-10-CM

## 2021-07-26 PROCEDURE — 77080 DXA BONE DENSITY AXIAL: CPT

## 2022-01-24 ENCOUNTER — TRANSCRIBE ORDERS (OUTPATIENT)
Dept: ADMINISTRATIVE | Facility: HOSPITAL | Age: 57
End: 2022-01-24

## 2022-01-24 ENCOUNTER — HOSPITAL ENCOUNTER (OUTPATIENT)
Dept: CT IMAGING | Facility: HOSPITAL | Age: 57
Discharge: HOME | End: 2022-01-24
Admitting: NURSE PRACTITIONER

## 2022-01-24 DIAGNOSIS — R10.31 RIGHT LOWER QUADRANT PAIN: Primary | ICD-10-CM

## 2022-01-24 DIAGNOSIS — R10.31 RIGHT LOWER QUADRANT PAIN: ICD-10-CM

## 2022-01-24 PROCEDURE — 74177 CT ABD & PELVIS W/CONTRAST: CPT

## 2022-01-24 PROCEDURE — 0 IOPAMIDOL PER 1 ML: Performed by: NURSE PRACTITIONER

## 2022-01-24 RX ADMIN — IOPAMIDOL 100 ML: 755 INJECTION, SOLUTION INTRAVENOUS at 18:02

## 2022-04-26 ENCOUNTER — TRANSCRIBE ORDERS (OUTPATIENT)
Dept: LAB | Facility: HOSPITAL | Age: 57
End: 2022-04-26

## 2022-04-26 ENCOUNTER — LAB (OUTPATIENT)
Dept: LAB | Facility: HOSPITAL | Age: 57
End: 2022-04-26

## 2022-04-26 DIAGNOSIS — M47.26 OTHER SPONDYLOSIS WITH RADICULOPATHY, LUMBAR REGION: ICD-10-CM

## 2022-04-26 DIAGNOSIS — M47.26 OTHER SPONDYLOSIS WITH RADICULOPATHY, LUMBAR REGION: Primary | ICD-10-CM

## 2022-04-26 LAB — MRSA DNA SPEC QL NAA+PROBE: NORMAL

## 2022-04-26 PROCEDURE — 87641 MR-STAPH DNA AMP PROBE: CPT

## 2022-05-20 ENCOUNTER — TRANSCRIBE ORDERS (OUTPATIENT)
Dept: ADMINISTRATIVE | Facility: HOSPITAL | Age: 57
End: 2022-05-20

## 2022-05-20 DIAGNOSIS — Z12.31 VISIT FOR SCREENING MAMMOGRAM: Primary | ICD-10-CM

## 2022-07-18 ENCOUNTER — HOSPITAL ENCOUNTER (OUTPATIENT)
Dept: MAMMOGRAPHY | Facility: HOSPITAL | Age: 57
Discharge: HOME | End: 2022-07-18
Admitting: NURSE PRACTITIONER

## 2022-07-18 DIAGNOSIS — Z12.31 VISIT FOR SCREENING MAMMOGRAM: ICD-10-CM

## 2022-07-18 PROCEDURE — 77067 SCR MAMMO BI INCL CAD: CPT

## 2022-07-18 PROCEDURE — 77063 BREAST TOMOSYNTHESIS BI: CPT

## 2022-09-20 ENCOUNTER — TRANSCRIBE ORDERS (OUTPATIENT)
Dept: ADMINISTRATIVE | Facility: HOSPITAL | Age: 57
End: 2022-09-20

## 2022-09-20 DIAGNOSIS — R22.2 MASS IN CHEST: Primary | ICD-10-CM

## 2022-09-20 DIAGNOSIS — R10.31 ABDOMINAL PAIN, RIGHT LOWER QUADRANT: ICD-10-CM

## 2022-10-12 ENCOUNTER — APPOINTMENT (OUTPATIENT)
Dept: CT IMAGING | Facility: HOSPITAL | Age: 57
End: 2022-10-12

## 2022-10-14 ENCOUNTER — HOSPITAL ENCOUNTER (OUTPATIENT)
Dept: CT IMAGING | Facility: HOSPITAL | Age: 57
Discharge: HOME | End: 2022-10-14
Admitting: NURSE PRACTITIONER

## 2022-10-14 DIAGNOSIS — R22.2 MASS IN CHEST: ICD-10-CM

## 2022-10-14 DIAGNOSIS — R10.31 ABDOMINAL PAIN, RIGHT LOWER QUADRANT: ICD-10-CM

## 2022-10-14 PROCEDURE — 74177 CT ABD & PELVIS W/CONTRAST: CPT

## 2022-10-14 PROCEDURE — 0 IOPAMIDOL PER 1 ML: Performed by: NURSE PRACTITIONER

## 2022-10-14 RX ADMIN — IOPAMIDOL 100 ML: 755 INJECTION, SOLUTION INTRAVENOUS at 12:25

## 2022-11-02 ENCOUNTER — OFFICE VISIT (OUTPATIENT)
Dept: SURGERY | Facility: CLINIC | Age: 57
End: 2022-11-02

## 2022-11-02 VITALS — HEIGHT: 63 IN | WEIGHT: 290.8 LBS | BODY MASS INDEX: 51.52 KG/M2 | RESPIRATION RATE: 14 BRPM

## 2022-11-02 DIAGNOSIS — E66.01 CLASS 3 SEVERE OBESITY WITHOUT SERIOUS COMORBIDITY WITH BODY MASS INDEX (BMI) OF 50.0 TO 59.9 IN ADULT, UNSPECIFIED OBESITY TYPE: Primary | ICD-10-CM

## 2022-11-02 DIAGNOSIS — K43.2 INCISIONAL HERNIA, WITHOUT OBSTRUCTION OR GANGRENE: ICD-10-CM

## 2022-11-02 PROCEDURE — 99203 OFFICE O/P NEW LOW 30 MIN: CPT | Performed by: SURGERY

## 2022-11-02 RX ORDER — PROPRANOLOL HCL 60 MG
CAPSULE, EXTENDED RELEASE 24HR ORAL
COMMUNITY
End: 2023-01-12 | Stop reason: SDUPTHER

## 2022-11-02 RX ORDER — PROPRANOLOL HYDROCHLORIDE 120 MG/1
CAPSULE, EXTENDED RELEASE ORAL
COMMUNITY
End: 2023-01-12 | Stop reason: SDUPTHER

## 2022-11-02 RX ORDER — FOLIC ACID 1 MG/1
TABLET ORAL
COMMUNITY

## 2022-11-02 RX ORDER — QUETIAPINE 150 MG/1
TABLET, FILM COATED, EXTENDED RELEASE ORAL
COMMUNITY
End: 2023-01-12

## 2022-11-02 RX ORDER — CHLORHEXIDINE GLUCONATE 213 G/1000ML
SOLUTION TOPICAL
COMMUNITY
End: 2023-01-12

## 2022-11-02 RX ORDER — LEVOTHYROXINE SODIUM 0.1 MG/1
100 TABLET ORAL DAILY
COMMUNITY
Start: 2022-08-22

## 2022-11-02 RX ORDER — CEFUROXIME AXETIL 250 MG/1
0.5 TABLET ORAL
COMMUNITY
End: 2023-01-12

## 2022-11-02 RX ORDER — ATORVASTATIN CALCIUM 40 MG/1
40 TABLET, FILM COATED ORAL DAILY
COMMUNITY
Start: 2022-08-11

## 2022-11-02 RX ORDER — LANOLIN ALCOHOL/MO/W.PET/CERES
1000 CREAM (GRAM) TOPICAL DAILY
COMMUNITY

## 2022-11-02 RX ORDER — BENZTROPINE MESYLATE 1 MG/1
1 TABLET ORAL DAILY
COMMUNITY
Start: 2022-10-17

## 2022-11-02 RX ORDER — TOPIRAMATE 100 MG/1
100 TABLET, FILM COATED ORAL 2 TIMES DAILY
COMMUNITY
Start: 2022-10-17

## 2022-11-02 RX ORDER — HYDROXYZINE HYDROCHLORIDE 10 MG/1
TABLET, FILM COATED ORAL
COMMUNITY

## 2022-11-02 RX ORDER — HYDROCODONE BITARTRATE AND ACETAMINOPHEN 10; 325 MG/1; MG/1
TABLET ORAL
COMMUNITY
Start: 2022-10-04

## 2022-11-02 RX ORDER — ALBUTEROL SULFATE 90 UG/1
AEROSOL, METERED RESPIRATORY (INHALATION)
COMMUNITY

## 2022-11-02 RX ORDER — PROMETHAZINE HYDROCHLORIDE 25 MG/1
TABLET ORAL AS NEEDED
COMMUNITY
Start: 2022-10-13

## 2022-11-02 RX ORDER — LIDOCAINE 50 MG/G
PATCH TOPICAL
COMMUNITY
End: 2023-01-12

## 2022-11-02 RX ORDER — MULTIPLE VITAMINS W/ MINERALS TAB 9MG-400MCG
1 TAB ORAL DAILY
COMMUNITY

## 2022-11-02 RX ORDER — HYDROCODONE BITARTRATE AND ACETAMINOPHEN 7.5; 325 MG/1; MG/1
TABLET ORAL
COMMUNITY
End: 2023-01-12 | Stop reason: SDUPTHER

## 2022-11-02 RX ORDER — PROPRANOLOL HYDROCHLORIDE 160 MG/1
CAPSULE, EXTENDED RELEASE ORAL
COMMUNITY

## 2022-11-02 RX ORDER — CETIRIZINE HYDROCHLORIDE 10 MG/1
10 TABLET ORAL DAILY
COMMUNITY

## 2022-11-02 RX ORDER — FLUTICASONE PROPIONATE 50 MCG
SPRAY, SUSPENSION (ML) NASAL
COMMUNITY

## 2022-11-02 RX ORDER — SERTRALINE HYDROCHLORIDE 100 MG/1
TABLET, FILM COATED ORAL
COMMUNITY
End: 2023-01-12 | Stop reason: SDUPTHER

## 2022-11-02 RX ORDER — GABAPENTIN 300 MG/1
CAPSULE ORAL
COMMUNITY
Start: 2022-10-28

## 2022-11-02 RX ORDER — EPINEPHRINE 0.3 MG/.3ML
INJECTION SUBCUTANEOUS
COMMUNITY

## 2022-11-02 RX ORDER — QUETIAPINE 300 MG/1
TABLET, FILM COATED, EXTENDED RELEASE ORAL
COMMUNITY
End: 2023-01-12

## 2022-11-07 NOTE — PROGRESS NOTES
General Surgery/Colorectal Surgery Note    Patient Name:  Carlee Shipley  YOB: 1965  [id number]    Referring Provider: Genesis Guillermo APRN      Patient Care Team:  Genesis Guillermo APRN as PCP - General (Nurse Practitioner)    Chief complaint hernia    Subjective .     History of present illness:    She has a 1 month history of a bulge noted to the right side of her abdomen.  No history of the same.  No history of incarceration or strangulation.  Previous exploratory laparotomy and open cholecystectomy.  Negative tobacco use.  No chest pain.  Recent intentional 10 pound weight loss.    CT abdomen pelvis 10/14/2022 with 4 cm ventral hernia containing portion of transverse colon.  No evidence of incarceration or strangulation.    History:  Past Medical History:   Diagnosis Date   • Bipolar 1 disorder (HCC)    • Depression    • Migraines    • Spine pain    • Thyroid disease        Past Surgical History:   Procedure Laterality Date   • GALLBLADDER SURGERY     • HYSTERECTOMY     • SPINAL CORD STIMULATOR IMPLANT     • TONSILLECTOMY AND ADENOIDECTOMY         Family History   Problem Relation Age of Onset   • Diabetes Mother    • Stroke Sister    • Diabetes Sister    • Diabetes Brother    • Stroke Brother    • Diabetes Maternal Grandmother    • Diabetes Paternal Grandmother        Social History     Tobacco Use   • Smoking status: Never   • Smokeless tobacco: Never   Vaping Use   • Vaping Use: Never used       Review of Systems  All systems were reviewed and negative except for:   Review of Systems   Constitutional: Negative for chills, fever and unexpected weight loss.   HENT: Negative for congestion, nosebleeds and voice change.    Eyes: Negative for blurred vision, double vision and discharge.   Respiratory: Negative for apnea, chest tightness and shortness of breath.    Cardiovascular: Negative for chest pain and leg swelling.   Gastrointestinal:        See HPI   Endocrine: Negative for cold  intolerance and heat intolerance.   Genitourinary: Negative for dysuria, hematuria and urgency.   Musculoskeletal: Negative for back pain, joint swelling and neck pain.   Skin: Negative for color change and dry skin.   Neurological: Negative for dizziness and confusion.   Hematological: Negative for adenopathy.   Psychiatric/Behavioral: Negative for agitation and behavioral problems.     MEDS:  Prior to Admission medications    Medication Sig Start Date End Date Taking? Authorizing Provider   albuterol sulfate  (90 Base) MCG/ACT inhaler albuterol sulfate HFA 90 mcg/actuation aerosol inhaler   INHALE 2 PUFFS BY MOUTH EVERY 4 TO 6 HOURS AS NEEDED FOR COUGH   Yes ProviderFranck MD   atorvastatin (LIPITOR) 40 MG tablet  8/11/22  Yes Franck Flaherty MD   benztropine (COGENTIN) 1 MG tablet  10/17/22  Yes Franck Flaherty MD   Cariprazine HCl (VRAYLAR) 3 MG capsule capsule Vraylar 3 mg capsule   TAKE 1 CAPSULE BY MOUTH ONCE DAILY   Yes Franck Flaherty MD   cetirizine (zyrTEC) 10 MG tablet Take 1 tablet by mouth Daily.   Yes ProviderFranck MD   Cholecalciferol (Vitamin D3) 1.25 MG (96492 UT) tablet Take  by mouth.   Yes Franck Flaherty MD   EPINEPHrine (EPIPEN) 0.3 MG/0.3ML solution auto-injector injection EpiPen 0.3 mg/0.3 mL injection auto-injector inject 0.3 mg by intramuscular route once as needed for anaphylaxis   Suspended   Yes Franck Flaherty MD   fluticasone (FLONASE) 50 MCG/ACT nasal spray fluticasone propionate 50 mcg/actuation nasal spray,suspension   USE TWO SPRAYS IN EACH NOSTRIL ONCE DAILY   Yes ProviderFranck MD   gabapentin (NEURONTIN) 300 MG capsule  10/28/22  Yes ProviderFranck MD   HYDROcodone-acetaminophen (NORCO)  MG per tablet  10/4/22  Yes Franck Flaherty MD   hydrOXYzine (ATARAX) 10 MG tablet hydroxyzine HCl 10 mg oral tablet take 1 tablet by oral route As needed   Active   Yes Franck Flaherty MD   levothyroxine  (SYNTHROID, LEVOTHROID) 100 MCG tablet  8/22/22  Yes Franck Flaherty MD   multivitamin with minerals (MULTIVITAMIN ADULT PO) Take 1 tablet by mouth Daily.   Yes Franck Flaherty MD   promethazine (PHENERGAN) 25 MG tablet  10/13/22  Yes Franck Flaherty MD   propranolol LA (INDERAL LA) 160 MG 24 hr capsule propranolol  mg capsule,24 hr,extended release   TAKE 1 CAPSULE BY MOUTH ONCE DAILY   Yes Franck Flaherty MD   sertraline (ZOLOFT) 50 MG tablet  10/18/22  Yes Franck Flaherty MD   topiramate (TOPAMAX) 100 MG tablet  10/17/22  Yes Franck Flaherty MD   vitamin B-12 (CYANOCOBALAMIN) 1000 MCG tablet Take 1 tablet by mouth Daily.   Yes Franck Flaherty MD   chlorhexidine (Hibiclens) 4 % external liquid Hibiclens 4 % topical liquid   Wash back and gluteal region with Hibiclens soap the night before and morning of procedure    Franck Flaherty MD   folic acid (FOLVITE) 1 MG tablet folic acid 1 mg oral tablet take 1 tablet (1 mg) by oral route once daily   Suspended    Franck Flaherty MD   HYDROcodone-acetaminophen (NORCO) 7.5-325 MG per tablet     Franck Flaherty MD   lidocaine (LIDODERM) 5 % Lidoderm 5 % topical adhesive patch,medicated one patch tid   Suspended    Franck Flaherty MD   PROMETHAZINE HCL PO     Franck Flaherty MD   propranolol LA (INDERAL LA) 120 MG 24 hr capsule Inderal  mg oral capsule,extended release 24 hr take 1 capsule (120 mg) by oral route once daily   Suspended    Franck Flaherty MD   propranolol LA (INDERAL LA) 60 MG 24 hr capsule Inderal LA 60 mg oral capsule,extended release 24 hr one po daily   Suspended    rFanck Flaherty MD   QUEtiapine XR (SEROquel XR) 150 MG 24 hr tablet Seroquel  mg oral tablet extended release 24 hr take 1 tablet (150 mg) by oral route once daily in the evening without food or with a light meal   Suspended    Franck Flaherty MD   QUEtiapine XR (SEROquel XR) 300 MG 24  hr tablet Seroquel  mg oral tablet extended release 24 hr take 2 tablets (600 mg) by oral route once daily in the evening without food or with a light meal   Suspended    Franck Flaherty MD   sertraline (ZOLOFT) 100 MG tablet sertraline 100 mg oral tablet take 1 tablet (100 mg) by oral route once daily   Suspended    Franck Flaherty MD   SUMAtriptan Succinate (IMITREX) 6 MG/0.5ML injection Inject prescribed dose at onset of headache. May repeat dose one time in 1 hour(s) if headache not relieved.    Franck Flaherty MD        Allergies:  Azithromycin, Chicken allergy, Corn-containing products, Fish allergy, Fluarix quadrivalent [influenza vac split quad], Hepatitis b virus vaccine recomb adjuvanted, Influenza vaccines, Pork allergy, and Seasonal ic [cholestatin]    Objective     Vital Signs        Physical Exam:     General Appearance:    Alert, cooperative, in no acute distress   Head:    Normocephalic, without obvious abnormality, atraumatic   Eyes:          Conjunctivae and sclerae normal, no icterus,     Ears:    Ears appear intact with no abnormalities noted   Throat:   No oral lesions, no thrush, oral mucosa moist   Neck:   No adenopathy, supple, trachea midline, no thyromegaly   Back:     No kyphosis present, no scoliosis present, no skin lesions,      erythema or scars, no tenderness to percussion or                   palpation,   range of motion normal   Lungs:     Clear to auscultation,respirations regular, even and                  unlabored    Heart:    Regular rhythm and normal rate, normal S1 and S2, no            murmur, no gallop, no rub, no click   Chest Wall:    No abnormalities observed   Abdomen:     Normal bowel sounds, no masses, no organomegaly, soft        non-tender, non-distended, no guarding, no rebound                tenderness, morbidly obese, reducible ventral incisional hernia without evidence of obstruction or gangrene   Rectal:        Extremities:   Moves all  "extremities well, no edema, no cyanosis, no             redness   Pulses:   Pulses palpable and equal bilaterally   Skin:   No bleeding, bruising or rash   Lymph nodes:   No palpable adenopathy   Neurologic:   A/o x 4 with no deficits       Results Review:   {Results Review:24776::\"I reviewed the patient's new clinical results.\"    LABS/IMAGING:  Results for orders placed or performed in visit on 04/26/22   MRSA Screen, PCR (Inpatient) - Swab, Nares    Specimen: Nares; Swab   Result Value Ref Range    MRSA PCR No MRSA Detected No MRSA Detected        Result Review :     Assessment & Plan       Initial reducible ventral incisional hernia without evidence of obstruction or gangrene  Obesity with a BMI 51    I personally reviewed the CT images with the patient.  I discussed what hernia was.  I discussed the warning signs of incarceration and strangulation.  She was instructed to go to the emergency department for any concern.  With her BMI 51, I recommended referral to a weight loss surgeon prior to considering elective hernia repair.  I encouraged her to keep trying to lose weight in the meantime.  She may wear an abdominal binder.  If she is able to lose weight then hopefully we can fix her hernia robotically.  Referral made to Dr. Wood all questions answered.  She agrees with the plan.  Thank for the consult                This document has been electronically signed by Fernando Pizarro MD  November 6, 2022 22:00 EST  "

## 2023-01-12 ENCOUNTER — OFFICE VISIT (OUTPATIENT)
Dept: CARDIOLOGY | Facility: CLINIC | Age: 58
End: 2023-01-12
Payer: MEDICARE

## 2023-01-12 VITALS
SYSTOLIC BLOOD PRESSURE: 106 MMHG | BODY MASS INDEX: 50.14 KG/M2 | HEIGHT: 63 IN | DIASTOLIC BLOOD PRESSURE: 72 MMHG | HEART RATE: 66 BPM | WEIGHT: 283 LBS

## 2023-01-12 DIAGNOSIS — E78.2 MIXED DYSLIPIDEMIA: ICD-10-CM

## 2023-01-12 DIAGNOSIS — E66.01 CLASS 3 SEVERE OBESITY DUE TO EXCESS CALORIES WITH SERIOUS COMORBIDITY AND BODY MASS INDEX (BMI) OF 50.0 TO 59.9 IN ADULT: Primary | ICD-10-CM

## 2023-01-12 PROCEDURE — 93000 ELECTROCARDIOGRAM COMPLETE: CPT | Performed by: INTERNAL MEDICINE

## 2023-01-12 PROCEDURE — 99203 OFFICE O/P NEW LOW 30 MIN: CPT | Performed by: INTERNAL MEDICINE

## 2023-01-12 NOTE — PROGRESS NOTES
Chief Complaint  Obesity      History of Present Illness  Carlee Shipley presents to [facility]    This is a very pleasant 57-year-old lady with super morbid obesity, BMI over 50, dyslipidemia, here for evaluation prior to initiating weight loss treatment.  She denies previous cardiac history.  She has no cardiac complaints.  She has no chest discomfort or dyspnea.  She has no orthopnea, edema, palpitations, presyncope or syncope.  She is not very physically active due to arthritis but reports no symptoms with grocery shopping/routine daily activities.      Past Medical History:   Diagnosis Date   • Bipolar 1 disorder (HCC)    • Depression    • Migraines    • Spine pain    • Thyroid disease          Current Outpatient Medications:   •  albuterol sulfate  (90 Base) MCG/ACT inhaler, albuterol sulfate HFA 90 mcg/actuation aerosol inhaler  INHALE 2 PUFFS BY MOUTH EVERY 4 TO 6 HOURS AS NEEDED FOR COUGH, Disp: , Rfl:   •  atorvastatin (LIPITOR) 40 MG tablet, Take 40 mg by mouth Daily., Disp: , Rfl:   •  benztropine (COGENTIN) 1 MG tablet, Take 1 mg by mouth Daily., Disp: , Rfl:   •  Cariprazine HCl (VRAYLAR) 3 MG capsule capsule, Vraylar 3 mg capsule  TAKE 1 CAPSULE BY MOUTH ONCE DAILY, Disp: , Rfl:   •  cetirizine (zyrTEC) 10 MG tablet, Take 1 tablet by mouth Daily., Disp: , Rfl:   •  Cholecalciferol (Vitamin D3) 1.25 MG (40720 UT) tablet, Take  by mouth 1 (One) Time Per Week., Disp: , Rfl:   •  EPINEPHrine (EPIPEN) 0.3 MG/0.3ML solution auto-injector injection, EpiPen 0.3 mg/0.3 mL injection auto-injector inject 0.3 mg by intramuscular route once as needed for anaphylaxis   Suspended, Disp: , Rfl:   •  fluticasone (FLONASE) 50 MCG/ACT nasal spray, fluticasone propionate 50 mcg/actuation nasal spray,suspension  USE TWO SPRAYS IN EACH NOSTRIL ONCE DAILY, Disp: , Rfl:   •  folic acid (FOLVITE) 1 MG tablet, folic acid 1 mg oral tablet take 1 tablet (1 mg) by oral route once daily    Suspended, Disp: , Rfl:   •  gabapentin (NEURONTIN) 300 MG capsule, , Disp: , Rfl:   •  HYDROcodone-acetaminophen (NORCO)  MG per tablet, , Disp: , Rfl:   •  hydrOXYzine (ATARAX) 10 MG tablet, hydroxyzine HCl 10 mg oral tablet take 1 tablet by oral route As needed   Active, Disp: , Rfl:   •  levothyroxine (SYNTHROID, LEVOTHROID) 100 MCG tablet, Take 100 mcg by mouth Daily., Disp: , Rfl:   •  multivitamin with minerals tablet tablet, Take 1 tablet by mouth Daily., Disp: , Rfl:   •  promethazine (PHENERGAN) 25 MG tablet, As Needed., Disp: , Rfl:   •  propranolol LA (INDERAL LA) 160 MG 24 hr capsule, propranolol  mg capsule,24 hr,extended release  TAKE 1 CAPSULE BY MOUTH ONCE DAILY, Disp: , Rfl:   •  sertraline (ZOLOFT) 50 MG tablet, Take 50 mg by mouth Daily., Disp: , Rfl:   •  topiramate (TOPAMAX) 100 MG tablet, 100 mg 2 (Two) Times a Day., Disp: , Rfl:   •  vitamin B-12 (CYANOCOBALAMIN) 1000 MCG tablet, Take 1 tablet by mouth Daily., Disp: , Rfl:     Medications Discontinued During This Encounter   Medication Reason   • chlorhexidine (Hibiclens) 4 % external liquid *Therapy completed   • HYDROcodone-acetaminophen (NORCO) 7.5-325 MG per tablet Duplicate order   • lidocaine (LIDODERM) 5 % *Therapy completed   • PROMETHAZINE HCL PO Duplicate order   • propranolol LA (INDERAL LA) 120 MG 24 hr capsule Duplicate order   • propranolol LA (INDERAL LA) 60 MG 24 hr capsule Duplicate order   • QUEtiapine XR (SEROquel XR) 150 MG 24 hr tablet *Therapy completed   • QUEtiapine XR (SEROquel XR) 300 MG 24 hr tablet *Therapy completed   • sertraline (ZOLOFT) 100 MG tablet Duplicate order   • SUMAtriptan Succinate (IMITREX) 6 MG/0.5ML injection *Therapy completed     Allergies   Allergen Reactions   • Azithromycin GI Intolerance   • Chicken Allergy Unknown - Low Severity   • Corn-Containing Products Unknown - Low Severity   • Fish Allergy Unknown - Low Severity   • Fluarix Quadrivalent [Influenza Vac Split Quad] Other  "(See Comments)   • Hepatitis B Virus Vaccine Recomb Adjuvanted Other (See Comments)   • Influenza Vaccines Hives   • Pork Allergy Unknown - Low Severity   • Seasonal Ic [Cholestatin] Unknown - Low Severity        Social History     Tobacco Use   • Smoking status: Never   • Smokeless tobacco: Never   Vaping Use   • Vaping Use: Never used       Family History   Problem Relation Age of Onset   • Diabetes Mother    • Stroke Sister    • Diabetes Sister    • Diabetes Brother    • Stroke Brother    • Diabetes Maternal Grandmother    • Diabetes Paternal Grandmother         Objective     /72   Pulse 66   Ht 160 cm (63\")   Wt 128 kg (283 lb)   BMI 50.13 kg/m²       Physical Exam  Constitutional:       General: Awake. Not in acute distress.     Appearance: Normal appearance.   Neck:      Vascular: No carotid bruit, hepatojugular reflux or JVD.   Cardiovascular:      Rate and Rhythm: Normal rate and regular rhythm.      Chest Wall: PMI is not displaced.      Heart sounds: Normal heart sounds, S1 normal and S2 normal. No murmur heard.   No friction rub. No gallop. No S3 or S4 sounds.    Pulmonary:      Effort: Pulmonary effort is normal.      Breath sounds: Normal breath sounds. No wheezing, rhonchi or rales.   Ext.      Right lower leg: No edema.      Left lower leg: No edema.   Skin:     General: Skin is warm and dry.      Coloration: Skin is not cyanotic.      Findings: No petechiae or rash.   Neurological:      Mental Status: Alert and oriented x 3  Psychiatric:         Behavior: Behavior is cooperative.       Result Review :     No results found for: PROBNP       Lab Results   Component Value Date    TSH 0.568 02/26/2021      Lab Results   Component Value Date    FREET4 1.4 02/26/2021          ECG 12 Lead    Date/Time: 1/12/2023 9:48 AM  Performed by: Gabo Quispe MD  Authorized by: Gabo Quispe MD   Comparison: not compared with previous ECG   Previous ECG: no previous ECG available  Rhythm: sinus " rhythm  BPM: 62  Other findings: non-specific ST-T wave changes              No results found for this or any previous visit.                Diagnoses and all orders for this visit:    1. Class 3 severe obesity due to excess calories with serious comorbidity and body mass index (BMI) of 50.0 to 59.9 in adult (HCC) (Primary)  -     ECG 12 Lead    2. Mixed dyslipidemia      Assessment: Patient with super morbid obesity with BMI exceeding 50, here for cardiac evaluation prior to initiating treatment for weight loss.  She has no active cardiac conditions.  Her exam is benign.  Her ECG shows sinus rhythm with nonspecific T wave changes in the precordial leads.  I will see an absolute contraindication for her to go on treatment for weight loss.  She was advised to report to us with any new symptoms or other concerns.  Otherwise, she will be seen on as-needed basis.    Follow Up       Return if symptoms worsen or fail to improve.    Patient was given instructions and counseling regarding her condition or for health maintenance advice. Please see specific information pulled into the AVS if appropriate.

## 2023-01-13 ENCOUNTER — PATIENT ROUNDING (BHMG ONLY) (OUTPATIENT)
Dept: CARDIOLOGY | Facility: CLINIC | Age: 58
End: 2023-01-13
Payer: MEDICARE

## 2023-01-13 NOTE — PROGRESS NOTES
January 13, 2023    Hello, may I speak with Carlee Shipley?    My name is [email or web address]     I am  with [facility] [place] [facility]  [facility]  [address] [place] [address] [phone].    Before we get started may I verify your date of birth? 1965    I am calling to officially welcome you to our practice and ask about your recent visit. Is this a good time to talk?   yes  Tell me about your visit with us. What things went well?    everthing went great.  It was wonderful      We're always looking for ways to make our patients' experiences even better. Do you have recommendations on ways we may improve?    None, everything was wonderful    Overall were you satisfied with your first visit to our practice?   Yes.      I appreciate you taking the time to speak with me today. Is there anything else I can do for you?   No     Thank you, and have a great day.

## 2023-03-07 ENCOUNTER — TELEPHONE (OUTPATIENT)
Dept: CARDIOLOGY | Facility: CLINIC | Age: 58
End: 2023-03-07
Payer: MEDICARE

## 2023-03-07 NOTE — TELEPHONE ENCOUNTER
Procedure: Spinal Cord Stimulator and/or Implant    Medication Directive: NA    PMH: BERNARDO    Last Seen: 01/12/2023

## 2023-03-13 ENCOUNTER — TRANSCRIBE ORDERS (OUTPATIENT)
Dept: LAB | Facility: HOSPITAL | Age: 58
End: 2023-03-13
Payer: MEDICARE

## 2023-03-13 ENCOUNTER — LAB (OUTPATIENT)
Dept: LAB | Facility: HOSPITAL | Age: 58
End: 2023-03-13
Payer: MEDICARE

## 2023-03-13 DIAGNOSIS — J30.89 OTHER ALLERGIC RHINITIS: ICD-10-CM

## 2023-03-13 DIAGNOSIS — J30.81 ALLERGIC RHINITIS DUE TO ANIMAL (CAT) (DOG) HAIR AND DANDER: ICD-10-CM

## 2023-03-13 DIAGNOSIS — J45.40 ASTHMA, MODERATE PERSISTENT, WELL-CONTROLLED: ICD-10-CM

## 2023-03-13 DIAGNOSIS — J45.40 ASTHMA, MODERATE PERSISTENT, WELL-CONTROLLED: Primary | ICD-10-CM

## 2023-03-13 PROCEDURE — 86003 ALLG SPEC IGE CRUDE XTRC EA: CPT

## 2023-03-13 PROCEDURE — 86008 ALLG SPEC IGE RECOMB EA: CPT

## 2023-03-13 PROCEDURE — 82785 ASSAY OF IGE: CPT

## 2023-03-13 PROCEDURE — 36415 COLL VENOUS BLD VENIPUNCTURE: CPT

## 2023-03-14 LAB
A-LACTALB IGE QN: <0.1 KU/L
B-LACTOGLOB IGE QN: <0.1 KU/L
CASEIN IGE QN: <0.1 KU/L
COW MILK IGE QN: 0.13 KU/L
IGE SERPL-ACNC: 50.3 KU/L

## 2023-03-17 LAB
A-LACTALB IGE QN: <0.1 KU/L
B-LACTOGLOB IGE QN: <0.1 KU/L
CASEIN IGE QN: <0.1 KU/L
CONV CLASS DESCRIPTION: NORMAL
PEANUT (RARA H) 1 IGE QN: <0.1 KU/L
PEANUT (RARA H) 2 IGE QN: <0.1 KU/L
PEANUT (RARA H) 3 IGE QN: <0.1 KU/L
PEANUT (RARA H) 6 IGE QN: <0.1 KU/L
PEANUT (RARA H) 8 IGE QN: <0.1 KU/L
PEANUT (RARA H) 9 IGE QN: <0.1 KU/L

## 2023-05-16 ENCOUNTER — TRANSCRIBE ORDERS (OUTPATIENT)
Dept: ADMINISTRATIVE | Facility: HOSPITAL | Age: 58
End: 2023-05-16
Payer: MEDICARE

## 2023-05-16 DIAGNOSIS — Z12.31 BREAST CANCER SCREENING BY MAMMOGRAM: Primary | ICD-10-CM

## 2023-08-23 ENCOUNTER — HOSPITAL ENCOUNTER (OUTPATIENT)
Dept: MAMMOGRAPHY | Facility: HOSPITAL | Age: 58
Discharge: HOME | End: 2023-08-23
Admitting: NURSE PRACTITIONER
Payer: MEDICARE

## 2023-08-23 DIAGNOSIS — Z12.31 BREAST CANCER SCREENING BY MAMMOGRAM: ICD-10-CM

## 2023-08-23 PROCEDURE — 77063 BREAST TOMOSYNTHESIS BI: CPT

## 2023-08-23 PROCEDURE — 77067 SCR MAMMO BI INCL CAD: CPT

## 2024-08-20 ENCOUNTER — TRANSCRIBE ORDERS (OUTPATIENT)
Dept: ADMINISTRATIVE | Facility: HOSPITAL | Age: 59
End: 2024-08-20
Payer: MEDICARE

## 2024-08-20 DIAGNOSIS — Z13.9 SCREENING DUE: Primary | ICD-10-CM

## 2024-08-28 ENCOUNTER — OFFICE VISIT (OUTPATIENT)
Dept: SURGERY | Facility: CLINIC | Age: 59
End: 2024-08-28
Payer: MEDICARE

## 2024-08-28 ENCOUNTER — PREP FOR SURGERY (OUTPATIENT)
Dept: OTHER | Facility: HOSPITAL | Age: 59
End: 2024-08-28
Payer: MEDICARE

## 2024-08-28 VITALS
WEIGHT: 210 LBS | DIASTOLIC BLOOD PRESSURE: 62 MMHG | HEIGHT: 63 IN | HEART RATE: 79 BPM | SYSTOLIC BLOOD PRESSURE: 100 MMHG | BODY MASS INDEX: 37.21 KG/M2

## 2024-08-28 DIAGNOSIS — Z86.010 HISTORY OF COLONIC POLYPS: ICD-10-CM

## 2024-08-28 DIAGNOSIS — Z12.11 SCREENING FOR MALIGNANT NEOPLASM OF COLON: Primary | ICD-10-CM

## 2024-08-28 PROBLEM — Z86.0100 HISTORY OF COLONIC POLYPS: Status: ACTIVE | Noted: 2024-08-28

## 2024-08-28 RX ORDER — SODIUM CHLORIDE 0.9 % (FLUSH) 0.9 %
3 SYRINGE (ML) INJECTION EVERY 12 HOURS SCHEDULED
OUTPATIENT
Start: 2024-08-28

## 2024-08-28 RX ORDER — LINACLOTIDE 145 UG/1
145 CAPSULE, GELATIN COATED ORAL DAILY
COMMUNITY
Start: 2024-04-25

## 2024-08-28 RX ORDER — BLOOD SUGAR DIAGNOSTIC
STRIP MISCELLANEOUS
COMMUNITY
Start: 2024-08-27

## 2024-08-28 RX ORDER — BUDESONIDE AND FORMOTEROL FUMARATE DIHYDRATE 160; 4.5 UG/1; UG/1
AEROSOL RESPIRATORY (INHALATION)
COMMUNITY
Start: 2023-08-01

## 2024-08-28 RX ORDER — LANCETS
EACH MISCELLANEOUS
COMMUNITY
Start: 2024-08-27

## 2024-08-28 RX ORDER — MONTELUKAST SODIUM 10 MG/1
10 TABLET ORAL DAILY
COMMUNITY
Start: 2023-08-01

## 2024-08-28 RX ORDER — PHENTERMINE HYDROCHLORIDE 37.5 MG/1
CAPSULE ORAL
COMMUNITY

## 2024-08-28 RX ORDER — SODIUM CHLORIDE 0.9 % (FLUSH) 0.9 %
10 SYRINGE (ML) INJECTION AS NEEDED
OUTPATIENT
Start: 2024-08-28

## 2024-08-28 RX ORDER — POTASSIUM CHLORIDE 1500 MG/1
TABLET, EXTENDED RELEASE ORAL
COMMUNITY
Start: 2024-08-09

## 2024-08-28 RX ORDER — SUMATRIPTAN 100 MG/1
TABLET, FILM COATED ORAL
COMMUNITY

## 2024-08-28 RX ORDER — SEMAGLUTIDE 2.68 MG/ML
INJECTION, SOLUTION SUBCUTANEOUS
COMMUNITY
Start: 2024-05-31

## 2024-08-28 RX ORDER — ERENUMAB-AOOE 140 MG/ML
140 INJECTION, SOLUTION SUBCUTANEOUS
COMMUNITY
Start: 2024-06-07

## 2024-08-28 RX ORDER — POLYETHYLENE GLYCOL 3350 17 G/17G
POWDER, FOR SOLUTION ORAL
Qty: 238 PACKET | Refills: 0 | Status: SHIPPED | OUTPATIENT
Start: 2024-08-28

## 2024-08-28 RX ORDER — SODIUM CHLORIDE 9 MG/ML
40 INJECTION, SOLUTION INTRAVENOUS AS NEEDED
OUTPATIENT
Start: 2024-08-28
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
## 2024-10-18 NOTE — PRE-PROCEDURE INSTRUCTIONS
Reminded of arrival time at  0930       , Entrance C of the [facility]. Instructed to bring or have a  over the age of 18 set up to drive you home the day of procedure.    Instructed on clear liquid diet the day before, nothing red or purple. Call with any questions about the prep or if in need of the prep.  Reminded them not to eat or drink anything am of procedure unless its a sip of water with medications.  Hold phentermine and ozempic 1 week prior to the procedure. Pt verbalized understanding.

## 2024-10-29 ENCOUNTER — ANESTHESIA EVENT (OUTPATIENT)
Dept: GASTROENTEROLOGY | Facility: HOSPITAL | Age: 59
End: 2024-10-29
Payer: MEDICARE

## 2024-10-29 NOTE — ANESTHESIA PREPROCEDURE EVALUATION
Anesthesia Evaluation     Patient summary reviewed and Nursing notes reviewed   NPO Solid Status: > 8 hours  NPO Liquid Status: > 4 hours           Airway   Mallampati: II  TM distance: >3 FB  Neck ROM: full  No difficulty expected  Dental    (+) edentulous    Pulmonary - normal exam    breath sounds clear to auscultation  (+) asthma (mild, intermittent),  Cardiovascular - normal exam  Exercise tolerance: good (4-7 METS)    ECG reviewed  Rhythm: regular  Rate: normal        Neuro/Psych  (+) headaches, psychiatric history Depression and Bipolar  GI/Hepatic/Renal/Endo    (+) obesity, morbid obesity, thyroid problem     Musculoskeletal     Abdominal   (+) obese    Abdomen: soft.   Substance History      OB/GYN          Other        ROS/Med Hx Other: Screening, hx polyps    Last dose Phentermine 10/22    Last dose Ozempic 10/19    EKG 01/12/23/: HR 62, abnormal R wave progression, borderline T abnormalities                       Anesthesia Plan    ASA 3     general   total IV anesthesia  (Total IV Anesthesia    Patient understands anesthesia not responsible for dental damage.  )  intravenous induction     Anesthetic plan, risks, benefits, and alternatives have been provided, discussed and informed consent has been obtained with: patient and sibling.  Pre-procedure education provided  Plan discussed with CRNA.        CODE STATUS:

## 2024-10-30 ENCOUNTER — HOSPITAL ENCOUNTER (OUTPATIENT)
Facility: HOSPITAL | Age: 59
Setting detail: HOSPITAL OUTPATIENT SURGERY
Discharge: HOME | End: 2024-10-30
Attending: SURGERY | Admitting: SURGERY
Payer: MEDICARE

## 2024-10-30 ENCOUNTER — ANESTHESIA (OUTPATIENT)
Dept: GASTROENTEROLOGY | Facility: HOSPITAL | Age: 59
End: 2024-10-30
Payer: MEDICARE

## 2024-10-30 ENCOUNTER — PREP FOR SURGERY (OUTPATIENT)
Dept: OTHER | Facility: HOSPITAL | Age: 59
End: 2024-10-30
Payer: MEDICARE

## 2024-10-30 VITALS
WEIGHT: 204.15 LBS | RESPIRATION RATE: 24 BRPM | SYSTOLIC BLOOD PRESSURE: 99 MMHG | HEART RATE: 70 BPM | OXYGEN SATURATION: 100 % | DIASTOLIC BLOOD PRESSURE: 56 MMHG | BODY MASS INDEX: 36.16 KG/M2 | TEMPERATURE: 97.6 F

## 2024-10-30 DIAGNOSIS — Z12.11 SCREENING FOR MALIGNANT NEOPLASM OF COLON: ICD-10-CM

## 2024-10-30 DIAGNOSIS — Z86.0100 HISTORY OF COLONIC POLYPS: ICD-10-CM

## 2024-10-30 DIAGNOSIS — Z86.0100 HISTORY OF COLONIC POLYPS: Primary | ICD-10-CM

## 2024-10-30 PROCEDURE — 25810000003 LACTATED RINGERS PER 1000 ML

## 2024-10-30 PROCEDURE — 25010000002 PROPOFOL 10 MG/ML EMULSION: Performed by: NURSE ANESTHETIST, CERTIFIED REGISTERED

## 2024-10-30 PROCEDURE — 88305 TISSUE EXAM BY PATHOLOGIST: CPT | Performed by: SURGERY

## 2024-10-30 PROCEDURE — 25010000002 LIDOCAINE PF 2% 2 % SOLUTION: Performed by: NURSE ANESTHETIST, CERTIFIED REGISTERED

## 2024-10-30 DEVICE — DEV CLIP ENDO RESOLUTION360 CONTRL ROT 235CM: Type: IMPLANTABLE DEVICE | Site: COLON | Status: FUNCTIONAL

## 2024-10-30 DEVICE — DEV CLIP HEMO RESOLUTION360/ULTR 235CM 17MM STRL: Type: IMPLANTABLE DEVICE | Site: COLON | Status: FUNCTIONAL

## 2024-10-30 RX ORDER — SODIUM CHLORIDE 0.9 % (FLUSH) 0.9 %
10 SYRINGE (ML) INJECTION AS NEEDED
Status: DISCONTINUED | OUTPATIENT
Start: 2024-10-30 | End: 2024-10-30 | Stop reason: HOSPADM

## 2024-10-30 RX ORDER — LIDOCAINE HYDROCHLORIDE 20 MG/ML
INJECTION, SOLUTION EPIDURAL; INFILTRATION; INTRACAUDAL; PERINEURAL AS NEEDED
Status: DISCONTINUED | OUTPATIENT
Start: 2024-10-30 | End: 2024-10-30 | Stop reason: SURG

## 2024-10-30 RX ORDER — SODIUM CHLORIDE 0.9 % (FLUSH) 0.9 %
3 SYRINGE (ML) INJECTION EVERY 12 HOURS SCHEDULED
Status: DISCONTINUED | OUTPATIENT
Start: 2024-10-30 | End: 2024-10-30 | Stop reason: HOSPADM

## 2024-10-30 RX ORDER — SODIUM CHLORIDE, SODIUM LACTATE, POTASSIUM CHLORIDE, CALCIUM CHLORIDE 600; 310; 30; 20 MG/100ML; MG/100ML; MG/100ML; MG/100ML
30 INJECTION, SOLUTION INTRAVENOUS CONTINUOUS
Status: DISCONTINUED | OUTPATIENT
Start: 2024-10-30 | End: 2024-10-30 | Stop reason: HOSPADM

## 2024-10-30 RX ORDER — SODIUM CHLORIDE 9 MG/ML
40 INJECTION, SOLUTION INTRAVENOUS AS NEEDED
Status: DISCONTINUED | OUTPATIENT
Start: 2024-10-30 | End: 2024-10-30 | Stop reason: HOSPADM

## 2024-10-30 RX ORDER — PROPOFOL 10 MG/ML
VIAL (ML) INTRAVENOUS AS NEEDED
Status: DISCONTINUED | OUTPATIENT
Start: 2024-10-30 | End: 2024-10-30 | Stop reason: SURG

## 2024-10-30 RX ADMIN — PROPOFOL 200 MCG/KG/MIN: 10 INJECTION, EMULSION INTRAVENOUS at 11:36

## 2024-10-30 RX ADMIN — SODIUM CHLORIDE, POTASSIUM CHLORIDE, SODIUM LACTATE AND CALCIUM CHLORIDE 30 ML/HR: 600; 310; 30; 20 INJECTION, SOLUTION INTRAVENOUS at 10:41

## 2024-10-30 RX ADMIN — PROPOFOL 70 MG: 10 INJECTION, EMULSION INTRAVENOUS at 11:36

## 2024-10-30 RX ADMIN — LIDOCAINE HYDROCHLORIDE 50 MG: 20 INJECTION, SOLUTION INTRAVENOUS at 11:36
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
## 2024-10-30 NOTE — ANESTHESIA POSTPROCEDURE EVALUATION
Patient: Carlee Shipley    Procedure Summary       Date: 10/30/24 Room / Location: [facility] ENDOSCOPY 1 / [facility] ENDOSCOPY    Anesthesia Start: 1134 Anesthesia Stop: 1224    Procedure: COLONOSCOPY POLYPECTOMY WITH HOT SNARE, ENDO CLIP X2 Diagnosis:       Screening for malignant neoplasm of colon      History of colonic polyps      (Screening for malignant neoplasm of colon [Z12.11])      (History of colonic polyps [Z86.010])    Surgeons: Christopher oCsme MD Provider: Romulo Messina CRNA    Anesthesia Type: general ASA Status: 3            Anesthesia Type: general    Vitals  Vitals Value Taken Time   BP 99/56 10/30/24 1240   Temp 36.4 °C (97.6 °F) 10/30/24 1225   Pulse 71 10/30/24 1242   Resp 24 10/30/24 1240   SpO2 91 % 10/30/24 1242   Vitals shown include unfiled device data.        Post Anesthesia Care and Evaluation    Post-procedure mental status: acceptable.  Pain management: satisfactory to patient    Airway patency: patent  Anesthetic complications: No anesthetic complications    Cardiovascular status: acceptable  Respiratory status: acceptable    Comments: Per chart review

## 2024-10-31 LAB
CYTO UR: NORMAL
LAB AP CASE REPORT: NORMAL
LAB AP CLINICAL INFORMATION: NORMAL
PATH REPORT.FINAL DX SPEC: NORMAL
PATH REPORT.GROSS SPEC: NORMAL

## 2024-12-27 ENCOUNTER — HOSPITAL ENCOUNTER (OUTPATIENT)
Dept: MAMMOGRAPHY | Facility: HOSPITAL | Age: 59
Discharge: HOME | End: 2024-12-27
Admitting: NURSE PRACTITIONER
Payer: MEDICARE

## 2024-12-27 DIAGNOSIS — Z13.9 SCREENING DUE: ICD-10-CM

## 2024-12-27 PROCEDURE — 77063 BREAST TOMOSYNTHESIS BI: CPT

## 2024-12-27 PROCEDURE — 77067 SCR MAMMO BI INCL CAD: CPT

## 2025-07-07 ENCOUNTER — TRANSCRIBE ORDERS (OUTPATIENT)
Dept: LAB | Facility: HOSPITAL | Age: 60
End: 2025-07-07
Payer: MEDICARE

## 2025-07-07 DIAGNOSIS — K46.9 NON-RECURRENT ABDOMINAL HERNIA WITHOUT OBSTRUCTION OR GANGRENE, UNSPECIFIED HERNIA TYPE: Primary | ICD-10-CM

## 2025-07-16 ENCOUNTER — HOSPITAL ENCOUNTER (OUTPATIENT)
Dept: CT IMAGING | Facility: HOSPITAL | Age: 60
Discharge: HOME | End: 2025-07-16
Admitting: NURSE PRACTITIONER
Payer: MEDICARE

## 2025-07-16 DIAGNOSIS — K46.9 NON-RECURRENT ABDOMINAL HERNIA WITHOUT OBSTRUCTION OR GANGRENE, UNSPECIFIED HERNIA TYPE: ICD-10-CM

## 2025-07-16 PROCEDURE — 74176 CT ABD & PELVIS W/O CONTRAST: CPT

## 2025-08-08 ENCOUNTER — TRANSCRIBE ORDERS (OUTPATIENT)
Dept: ADMINISTRATIVE | Facility: HOSPITAL | Age: 60
End: 2025-08-08
Payer: MEDICARE

## 2025-08-08 DIAGNOSIS — Z12.31 BREAST CANCER SCREENING BY MAMMOGRAM: Primary | ICD-10-CM

## 2025-08-14 ENCOUNTER — TRANSCRIBE ORDERS (OUTPATIENT)
Dept: ADMINISTRATIVE | Facility: HOSPITAL | Age: 60
End: 2025-08-14
Payer: MEDICARE

## 2025-08-14 DIAGNOSIS — Z78.0 POSTMENOPAUSAL STATUS: Primary | ICD-10-CM

## 2025-08-20 ENCOUNTER — HOSPITAL ENCOUNTER (OUTPATIENT)
Dept: BONE DENSITY | Facility: HOSPITAL | Age: 60
Discharge: HOME | End: 2025-08-20
Admitting: NURSE PRACTITIONER
Payer: MEDICARE

## 2025-08-20 DIAGNOSIS — Z78.0 POSTMENOPAUSAL STATUS: ICD-10-CM

## 2025-08-20 PROCEDURE — 77080 DXA BONE DENSITY AXIAL: CPT

## (undated) DEVICE — SOLIDIFIER LIQLOC PLS 1500CC BT

## (undated) DEVICE — GLV SURG BIOGEL LTX PF 7 1/2

## (undated) DEVICE — SOL IRRG H2O PL/BG 1000ML STRL

## (undated) DEVICE — Device

## (undated) DEVICE — THE SINGLE USE ETRAP – POLYP TRAP IS USED FOR SUCTION RETRIEVAL OF ENDOSCOPICALLY REMOVED POLYPS.: Brand: ETRAP

## (undated) DEVICE — SNAR E/S POLYP SNAREMASTER OVL/10MM 2.8X2300MM YEL

## (undated) DEVICE — PAD GRND REM POLYHESIVE A/ DISP

## (undated) DEVICE — CONN JET HYDRA H20 AUXILIARY DISP

## (undated) DEVICE — Device: Brand: DEFENDO AIR/WATER/SUCTION AND BIOPSY VALVE

## (undated) DEVICE — LINER SURG CANSTR SXN S/RIGD 1500CC

## (undated) DEVICE — SOL IRR NACL 0.9PCT BT 1000ML